# Patient Record
Sex: FEMALE | Race: ASIAN | NOT HISPANIC OR LATINO | URBAN - METROPOLITAN AREA
[De-identification: names, ages, dates, MRNs, and addresses within clinical notes are randomized per-mention and may not be internally consistent; named-entity substitution may affect disease eponyms.]

---

## 2022-11-03 ENCOUNTER — APPOINTMENT (RX ONLY)
Dept: URBAN - METROPOLITAN AREA CLINIC 38 | Facility: CLINIC | Age: 52
Setting detail: DERMATOLOGY
End: 2022-11-03

## 2022-11-03 DIAGNOSIS — Z41.9 ENCOUNTER FOR PROCEDURE FOR PURPOSES OTHER THAN REMEDYING HEALTH STATE, UNSPECIFIED: ICD-10-CM

## 2022-11-03 PROCEDURE — ? COSMETIC CONSULTATION: HYDRAFACIAL

## 2022-12-01 ENCOUNTER — APPOINTMENT (RX ONLY)
Dept: URBAN - METROPOLITAN AREA CLINIC 38 | Facility: CLINIC | Age: 52
Setting detail: DERMATOLOGY
End: 2022-12-01

## 2022-12-01 DIAGNOSIS — Z41.9 ENCOUNTER FOR PROCEDURE FOR PURPOSES OTHER THAN REMEDYING HEALTH STATE, UNSPECIFIED: ICD-10-CM

## 2022-12-01 PROCEDURE — ? HYDRAFACIAL

## 2022-12-01 NOTE — PROCEDURE: HYDRAFACIAL
Solution: Beta-HD
Solution: Activ-4
Vacuum Pressure High Setting (Will Not Render If Set To 0): 0
Tip: Hydropeel Tip, Teal
Vacuum Pressure Low Setting (Will Not Render If Set To 0): 15
Tip: Hydropeel Tip, Clear
Price (Use Numbers Only, No Special Characters Or $): 189
Procedure: Peel
Vacuum Pressure Low Setting (Will Not Render If Set To 0): 4
Vacuum Pressure Low Setting (Will Not Render If Set To 0): 3
Consent: Written and Verbal consent obtained, risks reviewed including but not limited to crusting, scabbing, blistering, scarring, darker or lighter pigmentary change, bruising, and/or incomplete response.
Treatment Number: 1
Solution Override
Vacuum Pressure High Setting (Will Not Render If Set To 0): 24
Indication: melasma
Vacuum Pressure High Setting (Will Not Render If Set To 0): 8
Post-Care Instructions: I reviewed with the patient in detail post-care instructions. Patient should stay away from the sun and wear sun protection until treated areas are fully healed.
Comments: Chief complaint is hyperpigmentation and uneven skin tone.\\nPatient has not had a professional treatment in over a year and is looking to achieve , brighter skin with long term goal of lightening hyperpigmentation and a more even tone.\\nPatient is considering VI Peel for next appointment.\\nProducts purchased today, CE Ferulic and Discoloration Defense.
Solution: GlySal 7.5%
Tip: Hydropeel Tip, Blue
Procedure: Extend and Protect
Procedure: Extraction
Number Of Passes: 2
Procedure: Exfoliation
Location: face